# Patient Record
Sex: FEMALE | Race: WHITE
[De-identification: names, ages, dates, MRNs, and addresses within clinical notes are randomized per-mention and may not be internally consistent; named-entity substitution may affect disease eponyms.]

---

## 2017-02-22 NOTE — PHYS DOC
Past Medical History


Past Medical History:  Other


Additional Past Medical Histor:  borderline personality d/o


Past Surgical History:  No Surgical History


Alcohol Use:  Rarely


Drug Use:  None





Adult General


Chief Complaint


Chief Complaint:  FLU SYMPTOM





HPI


HPI


Patient is a 21  year old female presents emergency room with a complaint of 

sneezing, coughing, congestion, headache and body aches that began yesterday. 

Patient states that she's been exposed to several other people home with upper 

respiratory infections. She states that she called her primary care doctor's 

office and inform them that she was having flulike symptoms. She states that 

she was advised by the primary care doctor's office to be tested for influenza. 

Patient denies any history of heart or lung disease. She reports she is a 

nonsmoker. She denies any foreign travel, antibiotic use or hospitalization 

within the past 90 days.





Review of Systems


Review of Systems





Constitutional: Denies fever or chills []


Eyes: Denies change in visual acuity, redness, or eye pain []


HENT: Denies nasal congestion or sore throat []


Respiratory: Denies cough or shortness of breath []


Cardiovascular: No additional information not addressed in HPI []


GI: Denies abdominal pain, nausea, vomiting, bloody stools or diarrhea []


: Denies dysuria or hematuria []


Musculoskeletal: Denies back pain or joint pain []


Integument: Denies rash or skin lesions []


Neurologic: Denies headache, focal weakness or sensory changes []


Endocrine: Denies polyuria or polydipsia []





Allergies


Allergies





 Allergies








Coded Allergies Type Severity Reaction Last Updated Verified


 


  No Known Drug Allergies    4/27/14 No











Physical Exam


Physical Exam





Constitutional: Well developed, well nourished, no acute distress, non-toxic 

appearance. 


HENT: Normocephalic, atraumatic, bilateral external ears normal, oropharynx 

moist, no oral exudates, scant clear rhinorrhea. There is no trismus or hot 

potato speech. Posterior oropharynx is healthy and pink in appearance. There 

are no tonsillar exudates, peritonsillar swelling or uvular deviation.


Eyes: PERRLA, EOMI, conjunctiva normal, no discharge. [] 


Neck: Normal range of motion, no tenderness, supple, no stridor. There is no 

meningismus. There is bilateral anterior and posterior cervical lymphadenopathy.


Cardiovascular:Heart rate regular rhythm, no murmur []


Lungs & Thorax: There is no respiratory distress or respiratory fatigue. Lung 

sounds are clear to auscultation bilaterally.


Abdomen: Bowel sounds normal, soft, no tenderness, no masses, no pulsatile 

masses. [] 


Skin: Warm, dry, no erythema, no rash. [] 


Back: No tenderness, no CVA tenderness. [] 


Extremities: No tenderness, no cyanosis, no clubbing, ROM intact, no edema. [] 


Neurologic: Alert and oriented X 3, normal motor function, normal sensory 

function, no focal deficits noted. []


Psychologic: Affect normal, judgement normal, mood normal. []





Current Patient Data


Vital Signs





 Vital Signs








  Date Time  Temp Pulse Resp B/P Pulse Ox O2 Delivery O2 Flow Rate FiO2


 


2/22/17 13:05 98.4 79 18  98 Room Air  





 98.4       








Lab Values





 Laboratory Tests








Test


  2/22/17


13:10


 


Influenza Type A Antigen


  Negative


(NEGATIVE)


 


Influenza Type B Antigen


  Negative


(NEGATIVE)











EKG


EKG


[]





Radiology/Procedures


Radiology/Procedures


[]





Course & Med Decision Making


Course & Med Decision Making


Influenza test today is negative.





Dragon Disclaimer


Dragon Disclaimer


This electronic medical record was generated, in whole or in part, using a 

voice recognition dictation system.





Departure


Departure


Impression:  


 Primary Impression:  


 URI (upper respiratory infection)


Disposition:  01 HOME, SELF-CARE


Condition:  GOOD


Referrals:  


MOHAMUD BARTLETT MD (PCP)


Patient Instructions:  Upper Respiratory Infection, Adult, Easy-to-Read





Additional Instructions:


1. Influenza test today is negative.


2. Review the discharge instructions for self-care and reasons to return the 

emergency department. Viral illnesses do not require treatment with antibiotics.


3. Over-the-counter cough and cold medicine. Afrin nasal spray twice a day for 

3 days while the nasal congestion. 


4. Follow-up with your primary care doctor in 5-7 days if there is no 

resolution or worsening of your symptoms.








TRISH OLIVEIRA Feb 22, 2017 13:53

## 2018-09-27 ENCOUNTER — HOSPITAL ENCOUNTER (EMERGENCY)
Dept: HOSPITAL 61 - ER | Age: 23
Discharge: HOME | End: 2018-09-27
Payer: COMMERCIAL

## 2018-09-27 VITALS — SYSTOLIC BLOOD PRESSURE: 117 MMHG | DIASTOLIC BLOOD PRESSURE: 73 MMHG

## 2018-09-27 VITALS — HEIGHT: 68 IN | WEIGHT: 157 LBS | BODY MASS INDEX: 23.79 KG/M2

## 2018-09-27 DIAGNOSIS — F10.20: ICD-10-CM

## 2018-09-27 DIAGNOSIS — R05: Primary | ICD-10-CM

## 2018-09-27 DIAGNOSIS — F31.9: ICD-10-CM

## 2018-09-27 PROCEDURE — 99281 EMR DPT VST MAYX REQ PHY/QHP: CPT

## 2018-09-27 NOTE — PHYS DOC
Past Medical History


Past Medical History:  Bipolar, Depression, Other


Additional Past Medical Histor:  borderline personality d/o


Past Surgical History:  No Surgical History


Alcohol Use:  Heavy


Drug Use:  None





Adult General


Chief Complaint


Chief Complaint:  COUGH





HPI


HPI





Patient is a 22  year old female who presents to the ER for evaluation of 

chronic cough. Patient reports chronic unchanged cough present for the past one 

and half years. Patient states that she is a daily smoker. Patient states that 

she was previously smoking 3 packs per day but is currently down to half pack 

per day. Patient denies any acute change in her cough was sent to the ER by her 

place of employment. Patient is following with a primary care physician for her 

chronic cough. Patient is currently on albuterol inhaler despite no diagnosis 

of asthma. Patient also reports multiple x-rays in the past and has completely 

normal Tb-test. Patient reports symptoms worsen lying supine has history of 

GERD. Patient does not take a daily GERD medication. She denies any acute 

changes in her chronic cough.





Review of Systems


Review of Systems





Constitutional: Denies fever or chills []





HENT: Denies nasal congestion or sore throat []


Respiratory: Cough present. No phlegm production.


Cardiovascular: No chest pain, no orthopnea, no lower extremity edema.


GI: Denies abdominal pain, nausea, vomiting, bloody stools or diarrhea []


: Denies dysuria or hematuria []











Endocrine: Denies polyuria or polydipsia []





All other systems were reviewed and found to be within normal limits, except as 

documented in this note.





Allergies


Allergies





Allergies








Coded Allergies Type Severity Reaction Last Updated Verified


 


  No Known Drug Allergies    4/27/14 No











Physical Exam


Physical Exam





Constitutional: Well developed, well nourished, no acute distress, non-toxic 

appearance. Mild intermittent dry cough.[]


HENT: Normocephalic, atraumatic, bilateral external ears normal, oropharynx 

moist, no oral exudates, nose normal. []No facial swimming, no facial swelling, 

no trismus, managing oral secretions, submental and sublingual spaces soft, 

speech is clear and non-muffled.


Eyes: PERRLA, EOMI, conjunctiva normal, no discharge. [] 


Neck:  no stridor. [] 


Cardiovascular:Heart rate regular rhythm, no murmur []


Lungs & Thorax:  Bilateral breath sounds clear to auscultation []





Extremities:  ROM intact, no edema. [] 


Neurologic: Alert and oriented X 3, , no focal deficits noted. []


Psychologic: Affect normal, judgement normal, mood normal. []





EKG


EKG


[]





Radiology/Procedures


Radiology/Procedures


[]





Course & Med Decision Making


Course & Med Decision Making


Pertinent Labs and Imaging studies reviewed. (See chart for details)





[]Patient in no respiratory distress and clear breath sounds on exam. Patient 

with history of chronic cough that is unchanged presents to the ER because she 

sent by her place of employment. Patient's been following with her PCP 

regarding this. Patient's cough worsens in the supine position. Increment that 

chest x-ray would have no utility given she has had previous chest x-rays for 

her chronic cough and no acute worsening of her symptoms. Advised increasing 

good medications a daily given the fact the symptoms worsen with supine 

position. Advised continue close follow up with PCP. Provided with work no. ER 

return precautions given. Patient verbalized understanding. All questions 

answered.





Dragon Disclaimer


Dragon Disclaimer


This electronic medical record was generated, in whole or in part, using a 

voice recognition dictation system.





Departure


Departure


Impression:  


 Primary Impression:  


 Cough


Disposition:  01 HOME, SELF-CARE


Condition:  STABLE


Referrals:  


MOHAMUD BARTLETT MD (PCP)


Patient Instructions:  Cough, Adult, Easy-to-Read





Additional Instructions:  


Thank you for coming to [facility]. Please repeat the attached 

handouts. Please follow-up with your primary care physician. Return to the ER  

if your symptoms worsen or you have any other concerns.


Stop smoking. Take your reflux medication daily. Follow-up with your primary 

care provider for continued management of your chronic cough.











GARY BOLANOS DO Sep 27, 2018 10:38

## 2018-11-18 ENCOUNTER — HOSPITAL ENCOUNTER (EMERGENCY)
Dept: HOSPITAL 61 - ER | Age: 23
LOS: 1 days | Discharge: HOME | End: 2018-11-19
Payer: COMMERCIAL

## 2018-11-18 VITALS — WEIGHT: 135 LBS | HEIGHT: 68 IN | BODY MASS INDEX: 20.46 KG/M2
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
## 2018-11-18 DIAGNOSIS — F31.9: ICD-10-CM

## 2018-11-18 DIAGNOSIS — K21.9: ICD-10-CM

## 2018-11-18 DIAGNOSIS — F41.9: ICD-10-CM

## 2018-11-18 DIAGNOSIS — F17.200: ICD-10-CM

## 2018-11-18 DIAGNOSIS — R55: Primary | ICD-10-CM

## 2018-11-18 DIAGNOSIS — T50.995A: ICD-10-CM

## 2018-11-18 DIAGNOSIS — F43.10: ICD-10-CM

## 2018-11-18 DIAGNOSIS — Z88.8: ICD-10-CM

## 2018-11-18 PROCEDURE — 80048 BASIC METABOLIC PNL TOTAL CA: CPT

## 2018-11-18 PROCEDURE — 93005 ELECTROCARDIOGRAM TRACING: CPT

## 2018-11-18 PROCEDURE — 99284 EMERGENCY DEPT VISIT MOD MDM: CPT

## 2018-11-18 PROCEDURE — 81025 URINE PREGNANCY TEST: CPT

## 2018-11-18 PROCEDURE — 85025 COMPLETE CBC W/AUTO DIFF WBC: CPT

## 2018-11-18 PROCEDURE — 36415 COLL VENOUS BLD VENIPUNCTURE: CPT

## 2018-11-18 PROCEDURE — 96360 HYDRATION IV INFUSION INIT: CPT

## 2018-11-19 VITALS — SYSTOLIC BLOOD PRESSURE: 91 MMHG | DIASTOLIC BLOOD PRESSURE: 52 MMHG

## 2018-11-19 LAB
ANION GAP SERPL CALC-SCNC: 6 MMOL/L (ref 6–14)
BASOPHILS # BLD AUTO: 0.1 X10^3/UL (ref 0–0.2)
BASOPHILS NFR BLD: 1 % (ref 0–3)
BUN SERPL-MCNC: 18 MG/DL (ref 7–20)
CALCIUM SERPL-MCNC: 8.8 MG/DL (ref 8.5–10.1)
CHLORIDE SERPL-SCNC: 103 MMOL/L (ref 98–107)
CO2 SERPL-SCNC: 30 MMOL/L (ref 21–32)
CREAT SERPL-MCNC: 0.6 MG/DL (ref 0.6–1)
EOSINOPHIL NFR BLD: 0.1 X10^3/UL (ref 0–0.7)
EOSINOPHIL NFR BLD: 1 % (ref 0–3)
ERYTHROCYTE [DISTWIDTH] IN BLOOD BY AUTOMATED COUNT: 14.6 % (ref 11.5–14.5)
GFR SERPLBLD BASED ON 1.73 SQ M-ARVRAT: 121.8 ML/MIN
GLUCOSE SERPL-MCNC: 114 MG/DL (ref 70–99)
HCT VFR BLD CALC: 35.7 % (ref 36–47)
HGB BLD-MCNC: 12.4 G/DL (ref 12–15.5)
LYMPHOCYTES # BLD: 2.5 X10^3/UL (ref 1–4.8)
LYMPHOCYTES NFR BLD AUTO: 25 % (ref 24–48)
MCH RBC QN AUTO: 31 PG (ref 25–35)
MCHC RBC AUTO-ENTMCNC: 35 G/DL (ref 31–37)
MCV RBC AUTO: 90 FL (ref 79–100)
MONO #: 0.6 X10^3/UL (ref 0–1.1)
MONOCYTES NFR BLD: 6 % (ref 0–9)
NEUT #: 6.8 X10^3UL (ref 1.8–7.7)
NEUTROPHILS NFR BLD AUTO: 68 % (ref 31–73)
PLATELET # BLD AUTO: 201 X10^3/UL (ref 140–400)
POTASSIUM SERPL-SCNC: 3.4 MMOL/L (ref 3.5–5.1)
RBC # BLD AUTO: 3.99 X10^6/UL (ref 3.5–5.4)
SODIUM SERPL-SCNC: 139 MMOL/L (ref 136–145)
WBC # BLD AUTO: 10 X10^3/UL (ref 4–11)

## 2018-11-19 NOTE — PHYS DOC
Past Medical History


Past Medical History:  Anxiety, Bipolar, Depression, GERD, Other


Additional Past Medical Histor:  borderline personality, insomnia, PTSD, ANIL, 

MDD


Past Surgical History:  No Surgical History


Additional Information:  


1 ppd


Alcohol Use:  Occasionally


Drug Use:  Marijuana





Adult General


Chief Complaint


Chief Complaint:  SYNCOPE





HPI


HPI





Patient is a 23  year old female with psychiatric history presents with 

recurrent syncopal episodes this evening after starting to new psychiatric 

medication this weekend. Patient reports feeling dizzy lightheaded after lying 

down on a couch. Reports brief syncopal episodes after standing and walking. 

Denies falls or injury. Denies headache,, palpitations, chest pain, chest 

tightness shortness of breath. Patient arrives by EMS, systolic blood pressure 

noted be in the 90s. No other acute symptoms or complaints. Last menstrual 

period was 3 weeks ago. []





Review of Systems


Review of Systems


Review symptoms as per history of present illness. All other review of symptoms 

are negative


All other systems were reviewed and found to be within normal limits, except as 

documented in this note.





Current Medications


Current Medications





Current Medications








 Medications


  (Trade)  Dose


 Ordered  Sig/Jose  Start Time


 Stop Time Status Last Admin


Dose Admin


 


 Sodium Chloride  1,000 ml @ 


 1,000 mls/hr  1X  ONCE  11/19/18 01:15


 11/19/18 02:14 DC 11/19/18 01:18


1,000 MLS/HR











Allergies


Allergies





Allergies








Coded Allergies Type Severity Reaction Last Updated Verified


 


  bupropion Allergy Severe Suicidal Ideation 11/19/18 Yes


 


  cariprazine Allergy Severe Suicidal Ideation 11/19/18 Yes


 


  lamotrigine Allergy Mild Rash 11/19/18 Yes


 


  lithium Allergy Unknown Unknown 11/19/18 Yes











Physical Exam


Physical Exam





Constitutional: Well developed, well nourished, no acute distress. []


HENT: Normocephalic, atraumatic, bilateral external ears normal, oropharynx 

moist, nose normal. []


Eyes: PERRLA, EOMI, conjunctiva normal. [] 


Neck: Normal range of motion. [] 


Cardiovascular:Heart rate regular rhythm, no murmur []


Lungs & Thorax:  Bilateral breath sounds clear to auscultation. []


Abdomen: Bowel sounds normal, soft, no tenderness. [] 


Skin: Warm, dry. [] 


Back: No tenderness. [] 


Extremities: No tenderness, no edema. [] 


Neurologic: Alert and oriented X 3, normal motor function, normal sensory 

function, no focal deficits noted. []


Psychologic: Affect normal, judgement normal, mood normal. []





Current Patient Data


Vital Signs





 Vital Signs








  Date Time  Temp Pulse Resp B/P (MAP) Pulse Ox O2 Delivery O2 Flow Rate FiO2


 


11/19/18 01:48  84 12 91/52 (65) 98 Room Air  


 


11/18/18 23:52 98.0       





 98.0       








Lab Values





 Laboratory Tests








Test


 11/19/18


00:02 11/19/18


00:20


 


POC Urine HCG, Qualitative


 Hcg negative


(Negative) 





 


White Blood Count


 


 10.0 x10^3/uL


(4.0-11.0)


 


Red Blood Count


 


 3.99 x10^6/uL


(3.50-5.40)


 


Hemoglobin


 


 12.4 g/dL


(12.0-15.5)


 


Hematocrit


 


 35.7 %


(36.0-47.0)  L


 


Mean Corpuscular Volume


 


 90 fL ()





 


Mean Corpuscular Hemoglobin  31 pg (25-35)  


 


Mean Corpuscular Hemoglobin


Concent 


 35 g/dL


(31-37)


 


Red Cell Distribution Width


 


 14.6 %


(11.5-14.5)  H


 


Platelet Count


 


 201 x10^3/uL


(140-400)


 


Neutrophils (%) (Auto)  68 % (31-73)  


 


Lymphocytes (%) (Auto)  25 % (24-48)  


 


Monocytes (%) (Auto)  6 % (0-9)  


 


Eosinophils (%) (Auto)  1 % (0-3)  


 


Basophils (%) (Auto)  1 % (0-3)  


 


Neutrophils # (Auto)


 


 6.8 x10^3uL


(1.8-7.7)


 


Lymphocytes # (Auto)


 


 2.5 x10^3/uL


(1.0-4.8)


 


Monocytes # (Auto)


 


 0.6 x10^3/uL


(0.0-1.1)


 


Eosinophils # (Auto)


 


 0.1 x10^3/uL


(0.0-0.7)


 


Basophils # (Auto)


 


 0.1 x10^3/uL


(0.0-0.2)


 


Sodium Level


 


 139 mmol/L


(136-145)


 


Potassium Level


 


 3.4 mmol/L


(3.5-5.1)  L


 


Chloride Level


 


 103 mmol/L


()


 


Carbon Dioxide Level


 


 30 mmol/L


(21-32)


 


Anion Gap  6 (6-14)  


 


Blood Urea Nitrogen


 


 18 mg/dL


(7-20)


 


Creatinine


 


 0.6 mg/dL


(0.6-1.0)


 


Estimated GFR


(Cockcroft-Gault) 


 121.8  





 


Glucose Level


 


 114 mg/dL


(70-99)  H


 


Calcium Level


 


 8.8 mg/dL


(8.5-10.1)





 Laboratory Tests


11/19/18 00:20








 Laboratory Tests


11/19/18 00:20














EKG


EKG


[EKG: Reviewed]





Radiology/Procedures


Radiology/Procedures


[]





Course & Med Decision Making


Course & Med Decision Making


Pertinent Labs and Imaging studies reviewed. (See chart for details)





[IV fluids given, symptoms resolved. Patient walks a steady gait. Patient 

instructed to hold psychiatric medications until she follows up with her 

prescribing physician.]





Dragon Disclaimer


Dragon Disclaimer


This electronic medical record was generated, in whole or in part, using a 

voice recognition dictation system.





Departure


Departure


Impression:  


 Primary Impression:  


 Orthostatic syncope


 Additional Impression:  


 Adverse drug reaction


Disposition:  01 HOME, SELF-CARE


Condition:  GOOD


Patient Instructions:  Orthostatic Hypotension





Additional Instructions:  


You were evaluated in the emergency department for fainting episodes. Although 

the exact cause of your symptoms has not been been determined, it is likely 

that your symptoms were caused by low blood pressure due to new medications. 

Please discontinue all newly prescribed medications and follow-up with your 

prescribing physician.





Problem Qualifiers











ALLYSON SWANN DO Nov 19, 2018 05:38

## 2018-11-19 NOTE — EKG
[facility]

              [address] [place] [address]

Test Date:    2018               Test Time:    00:06:01

Pat Name:     ARJUN MOLINA           Department:   

Patient ID:   [id number]           Room:          

Gender:       Female                   Technician:   

:          [id number]               Requested By: ALLYSON SWANN

Order Number: [id number]           Reading MD:   Azael Cohn MD

                                 Measurements

Intervals                              Axis          

Rate:         86                       P:            74

OK:           166                      QRS:          59

QRSD:         90                       T:            62

QT:           374                                    

QTc:          451                                    

                           Interpretive Statements

SINUS ARRHYTHMIA



Electronically Signed On 2018 14:32:18 CST by Azael Cohn MD